# Patient Record
Sex: FEMALE | Race: AMERICAN INDIAN OR ALASKA NATIVE
[De-identification: names, ages, dates, MRNs, and addresses within clinical notes are randomized per-mention and may not be internally consistent; named-entity substitution may affect disease eponyms.]

---

## 2018-05-04 ENCOUNTER — HOSPITAL ENCOUNTER (EMERGENCY)
Dept: HOSPITAL 5 - ED | Age: 38
LOS: 6 days | Discharge: PSYCHIATRIC OR SUBSTANCE USE FACILITY | End: 2018-05-10
Payer: COMMERCIAL
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
## 2018-05-04 DIAGNOSIS — T40.4X2A: Primary | ICD-10-CM

## 2018-05-04 DIAGNOSIS — Z88.0: ICD-10-CM

## 2018-05-04 DIAGNOSIS — T42.8X2A: ICD-10-CM

## 2018-05-04 LAB
BACTERIA #/AREA URNS HPF: (no result) /HPF
BASOPHILS # (AUTO): 0 K/MM3 (ref 0–0.1)
BASOPHILS NFR BLD AUTO: 0.3 % (ref 0–1.8)
BENZODIAZEPINES SCREEN,URINE: (no result)
BILIRUB UR QL STRIP: (no result)
BLOOD UR QL VISUAL: (no result)
BUN SERPL-MCNC: 6 MG/DL (ref 7–17)
BUN/CREAT SERPL: 8 %
CALCIUM SERPL-MCNC: 9.1 MG/DL (ref 8.4–10.2)
EOSINOPHIL # BLD AUTO: 0.1 K/MM3 (ref 0–0.4)
EOSINOPHIL NFR BLD AUTO: 1 % (ref 0–4.3)
HCT VFR BLD CALC: 34 % (ref 30.3–42.9)
HEMOLYSIS INDEX: 9
HGB BLD-MCNC: 10.5 GM/DL (ref 10.1–14.3)
LYMPHOCYTES # BLD AUTO: 2.8 K/MM3 (ref 1.2–5.4)
LYMPHOCYTES NFR BLD AUTO: 31.7 % (ref 13.4–35)
MCH RBC QN AUTO: 24 PG (ref 28–32)
MCHC RBC AUTO-ENTMCNC: 31 % (ref 30–34)
MCV RBC AUTO: 77 FL (ref 79–97)
METHADONE SCREEN,URINE: (no result)
MONOCYTES # (AUTO): 0.4 K/MM3 (ref 0–0.8)
MONOCYTES % (AUTO): 4.9 % (ref 0–7.3)
MUCOUS THREADS #/AREA URNS HPF: (no result) /HPF
OPIATE SCREEN,URINE: (no result)
PH UR STRIP: 5 [PH] (ref 5–7)
PLATELET # BLD: 370 K/MM3 (ref 140–440)
PROT UR STRIP-MCNC: (no result) MG/DL
RBC # BLD AUTO: 4.44 M/MM3 (ref 3.65–5.03)
RBC #/AREA URNS HPF: 10 /HPF (ref 0–6)
UROBILINOGEN UR-MCNC: < 2 MG/DL (ref ?–2)
WBC #/AREA URNS HPF: 13 /HPF (ref 0–6)

## 2018-05-04 PROCEDURE — 93010 ELECTROCARDIOGRAM REPORT: CPT

## 2018-05-04 PROCEDURE — 99285 EMERGENCY DEPT VISIT HI MDM: CPT

## 2018-05-04 PROCEDURE — 82550 ASSAY OF CK (CPK): CPT

## 2018-05-04 PROCEDURE — 80048 BASIC METABOLIC PNL TOTAL CA: CPT

## 2018-05-04 PROCEDURE — 36415 COLL VENOUS BLD VENIPUNCTURE: CPT

## 2018-05-04 PROCEDURE — 80320 DRUG SCREEN QUANTALCOHOLS: CPT

## 2018-05-04 PROCEDURE — 84703 CHORIONIC GONADOTROPIN ASSAY: CPT

## 2018-05-04 PROCEDURE — G0480 DRUG TEST DEF 1-7 CLASSES: HCPCS

## 2018-05-04 PROCEDURE — 93005 ELECTROCARDIOGRAM TRACING: CPT

## 2018-05-04 PROCEDURE — 81001 URINALYSIS AUTO W/SCOPE: CPT

## 2018-05-04 PROCEDURE — 85025 COMPLETE CBC W/AUTO DIFF WBC: CPT

## 2018-05-04 PROCEDURE — 80307 DRUG TEST PRSMV CHEM ANLYZR: CPT

## 2018-05-04 NOTE — EMERGENCY DEPARTMENT REPORT
ED General Adult HPI





- General


Chief complaint: Overdose


Stated complaint: POSSIBLE OD


Time Seen by Provider: 05/04/18 19:06


Source: patient, EMS (verbal report received from EMS.ems notes not available 

at time of  chart dictation), RN notes reviewed


Mode of arrival: Stretcher


Limitations: No Limitations





- History of Present Illness


Initial comments: 





This is a 37-year-old female who was previously unknown to this provider.  She 

is brought to the hospital by EMS for a suicide attempt and overdose.  She 

apparently attempted to overdose on tramadol and Robaxin at 3:30 PM.  She 

denies other coingestions.  Patient has no complaints at this time.  She denies 

headache, neck pain, chest pain, abdominal pain and shortness of breath.  She 

indicates she does not have access to guns or firearms.  She does not have 

hallucinations.  She is now remorseful about her attempt, and indicated she 

would like to live to care for her family and children.


-: Sudden


Consistency: now resolved


Improves with: none


Worsens with: none


Associated Symptoms: denies other symptoms.  denies: confusion, chest pain, 

cough, diaphoresis, fever/chills, headaches, loss of appetite, malaise, nausea/

vomiting, rash, seizure, shortness of breath, syncope, weakness





- Related Data


 Home Medications











 Medication  Instructions  Recorded  Confirmed  Last Taken


 


No Known Home Medications [No  05/04/18 05/04/18 Unknown





Reported Home Medications]    











 Allergies











Allergy/AdvReac Type Severity Reaction Status Date / Time


 


Penicillins AdvReac  Anaphylaxis Verified 05/04/18 19:05














ED Review of Systems


ROS: 


Stated complaint: POSSIBLE OD


Other details as noted in HPI





Comment: All other systems reviewed and negative


Gastrointestinal: denies: abdominal pain


Genitourinary: denies: dysuria


Neurological: denies: headache


Psychiatric: anxiety, depression, suicidal thoughts.  denies: auditory 

hallucinations, visual hallucinations, homicidal thoughts





ED Past Medical Hx





- Past Medical History


Previous Medical History?: No





- Surgical History


Past Surgical History?: Yes


Additional Surgical History: tubal ligation 2002





- Social History


Smoking Status: Never Smoker


Substance Use Type: None





- Medications


Home Medications: 


 Home Medications











 Medication  Instructions  Recorded  Confirmed  Last Taken  Type


 


No Known Home Medications [No  05/04/18 05/04/18 Unknown History





Reported Home Medications]     














ED Physical Exam





- General


Limitations: No Limitations


General appearance: alert, in distress





- Head


Head exam: Present: atraumatic, normocephalic





- Eye


Eye exam: Present: normal appearance, PERRL, EOMI





- ENT


ENT exam: Present: normal exam, normal orophraynx, mucous membranes moist, 

normal external ear exam





- Neck


Neck exam: Present: normal inspection, full ROM





- Respiratory


Respiratory exam: Present: normal lung sounds bilaterally.  Absent: respiratory 

distress





- Cardiovascular


Cardiovascular Exam: Present: normal rhythm, bradycardia, normal heart sounds.  

Absent: systolic murmur, diastolic murmur, rubs, gallop





- GI/Abdominal


GI/Abdominal exam: Present: soft, normal bowel sounds.  Absent: distended, 

tenderness, guarding, rebound, rigid, pulsatile mass





- Extremities Exam


Extremities exam: Present: normal inspection, full ROM, normal capillary 

refill.  Absent: pedal edema, joint swelling, calf tenderness





- Back Exam


Back exam: Present: normal inspection, full ROM.  Absent: tenderness, CVA 

tenderness (R), paraspinal tenderness, vertebral tenderness





- Neurological Exam


Neurological exam: Present: alert, oriented X3, CN II-XII intact, normal gait, 

other (Extraocular movements intact.  Tongue midline.  No facial droop.  Facial 

sensation intact to light touch in the V1, V2, V3 distribution bilaterally.  5 

and 5 strength in 4 extremities..  Sensation is intact to light touch in 4 

extremities.).  Absent: motor sensory deficit





- Psychiatric


Psychiatric exam: Present: depressed, anxious.  Absent: homicidal ideation





- Skin


Skin exam: Present: warm, dry, intact, normal color.  Absent: rash





ED Course


 Vital Signs











  05/04/18 05/04/18 05/04/18





  16:48 18:12 20:56


 


Temperature 98.7 F  99.1 F


 


Pulse Rate 57 L  57 L


 


Respiratory 18 16 18





Rate   


 


Blood Pressure 162/91  


 


Blood Pressure   141/80





[Left]   


 


O2 Sat by Pulse 99  99





Oximetry   














ED Medical Decision Making





- Lab Data


Result diagrams: 


 05/04/18 18:27





 05/04/18 18:27








 Vital Signs











  05/04/18 05/04/18 05/04/18





  16:48 18:12 20:56


 


Temperature 98.7 F  99.1 F


 


Pulse Rate 57 L  57 L


 


Respiratory 18 16 18





Rate   


 


Blood Pressure 162/91  


 


Blood Pressure   141/80





[Left]   


 


O2 Sat by Pulse 99  99





Oximetry   











Labs











  05/04/18 05/04/18 05/04/18





  18:27 18:27 18:27


 


WBC   


 


RBC   


 


Hgb   


 


Hct   


 


MCV   


 


MCH   


 


MCHC   


 


RDW   


 


Plt Count   


 


Lymph % (Auto)   


 


Mono % (Auto)   


 


Eos % (Auto)   


 


Baso % (Auto)   


 


Lymph #   


 


Mono #   


 


Eos #   


 


Baso #   


 


Seg Neutrophils %   


 


Seg Neutrophils #   


 


Sodium    140


 


Potassium    3.5 L


 


Chloride    103.3


 


Carbon Dioxide    22


 


Anion Gap    18


 


BUN    6 L


 


Creatinine    0.8


 


Estimated GFR    > 60


 


BUN/Creatinine Ratio    8


 


Glucose    94


 


Calcium    9.1


 


Total Creatine Kinase   


 


HCG, Qual   


 


Urine Color   


 


Urine Turbidity   


 


Urine pH   


 


Ur Specific Gravity   


 


Urine Protein   


 


Urine Glucose (UA)   


 


Urine Ketones   


 


Urine Blood   


 


Urine Nitrite   


 


Urine Bilirubin   


 


Urine Urobilinogen   


 


Ur Leukocyte Esterase   


 


Urine WBC (Auto)   


 


Urine RBC (Auto)   


 


U Epithel Cells (Auto)   


 


Urine Bacteria (Auto)   


 


Urine Mucus   


 


Salicylates  < 0.3 L  


 


Urine Opiates Screen   


 


Urine Methadone Screen   


 


Acetaminophen   24.8 


 


Ur Barbiturates Screen   


 


Ur Phencyclidine Scrn   


 


Ur Amphetamines Screen   


 


U Benzodiazepines Scrn   


 


Urine Cocaine Screen   


 


U Marijuana (THC) Screen   


 


Drugs of Abuse Note   


 


Plasma/Serum Alcohol   














  05/04/18 05/04/18 05/04/18





  18:27 18:27 18:27


 


WBC    8.8


 


RBC    4.44


 


Hgb    10.5


 


Hct    34.0


 


MCV    77 L


 


MCH    24 L


 


MCHC    31


 


RDW    19.6 H


 


Plt Count    370


 


Lymph % (Auto)    31.7


 


Mono % (Auto)    4.9


 


Eos % (Auto)    1.0


 


Baso % (Auto)    0.3


 


Lymph #    2.8


 


Mono #    0.4


 


Eos #    0.1


 


Baso #    0.0


 


Seg Neutrophils %    62.1


 


Seg Neutrophils #    5.5


 


Sodium   


 


Potassium   


 


Chloride   


 


Carbon Dioxide   


 


Anion Gap   


 


BUN   


 


Creatinine   


 


Estimated GFR   


 


BUN/Creatinine Ratio   


 


Glucose   


 


Calcium   


 


Total Creatine Kinase   


 


HCG, Qual   Negative 


 


Urine Color   


 


Urine Turbidity   


 


Urine pH   


 


Ur Specific Gravity   


 


Urine Protein   


 


Urine Glucose (UA)   


 


Urine Ketones   


 


Urine Blood   


 


Urine Nitrite   


 


Urine Bilirubin   


 


Urine Urobilinogen   


 


Ur Leukocyte Esterase   


 


Urine WBC (Auto)   


 


Urine RBC (Auto)   


 


U Epithel Cells (Auto)   


 


Urine Bacteria (Auto)   


 


Urine Mucus   


 


Salicylates   


 


Urine Opiates Screen   


 


Urine Methadone Screen   


 


Acetaminophen   


 


Ur Barbiturates Screen   


 


Ur Phencyclidine Scrn   


 


Ur Amphetamines Screen   


 


U Benzodiazepines Scrn   


 


Urine Cocaine Screen   


 


U Marijuana (THC) Screen   


 


Drugs of Abuse Note   


 


Plasma/Serum Alcohol  < 0.01  














  05/04/18 05/04/18 05/04/18





  18:27 19:29 20:50


 


WBC   


 


RBC   


 


Hgb   


 


Hct   


 


MCV   


 


MCH   


 


MCHC   


 


RDW   


 


Plt Count   


 


Lymph % (Auto)   


 


Mono % (Auto)   


 


Eos % (Auto)   


 


Baso % (Auto)   


 


Lymph #   


 


Mono #   


 


Eos #   


 


Baso #   


 


Seg Neutrophils %   


 


Seg Neutrophils #   


 


Sodium   


 


Potassium   


 


Chloride   


 


Carbon Dioxide   


 


Anion Gap   


 


BUN   


 


Creatinine   


 


Estimated GFR   


 


BUN/Creatinine Ratio   


 


Glucose   


 


Calcium   


 


Total Creatine Kinase  112  


 


HCG, Qual   


 


Urine Color    Yellow


 


Urine Turbidity    Clear


 


Urine pH    5.0


 


Ur Specific Gravity    1.018


 


Urine Protein    <15 mg/dl


 


Urine Glucose (UA)    Neg


 


Urine Ketones    Neg


 


Urine Blood    Mod


 


Urine Nitrite    Neg


 


Urine Bilirubin    Neg


 


Urine Urobilinogen    < 2.0


 


Ur Leukocyte Esterase    Tr


 


Urine WBC (Auto)    13.0 H


 


Urine RBC (Auto)    10.0


 


U Epithel Cells (Auto)    1.0


 


Urine Bacteria (Auto)    2+


 


Urine Mucus    Few


 


Salicylates   


 


Urine Opiates Screen   


 


Urine Methadone Screen   


 


Acetaminophen   21.3 


 


Ur Barbiturates Screen   


 


Ur Phencyclidine Scrn   


 


Ur Amphetamines Screen   


 


U Benzodiazepines Scrn   


 


Urine Cocaine Screen   


 


U Marijuana (THC) Screen   


 


Drugs of Abuse Note   


 


Plasma/Serum Alcohol   














  05/04/18





  20:50


 


WBC 


 


RBC 


 


Hgb 


 


Hct 


 


MCV 


 


MCH 


 


MCHC 


 


RDW 


 


Plt Count 


 


Lymph % (Auto) 


 


Mono % (Auto) 


 


Eos % (Auto) 


 


Baso % (Auto) 


 


Lymph # 


 


Mono # 


 


Eos # 


 


Baso # 


 


Seg Neutrophils % 


 


Seg Neutrophils # 


 


Sodium 


 


Potassium 


 


Chloride 


 


Carbon Dioxide 


 


Anion Gap 


 


BUN 


 


Creatinine 


 


Estimated GFR 


 


BUN/Creatinine Ratio 


 


Glucose 


 


Calcium 


 


Total Creatine Kinase 


 


HCG, Qual 


 


Urine Color 


 


Urine Turbidity 


 


Urine pH 


 


Ur Specific Gravity 


 


Urine Protein 


 


Urine Glucose (UA) 


 


Urine Ketones 


 


Urine Blood 


 


Urine Nitrite 


 


Urine Bilirubin 


 


Urine Urobilinogen 


 


Ur Leukocyte Esterase 


 


Urine WBC (Auto) 


 


Urine RBC (Auto) 


 


U Epithel Cells (Auto) 


 


Urine Bacteria (Auto) 


 


Urine Mucus 


 


Salicylates 


 


Urine Opiates Screen  Presumptive negative


 


Urine Methadone Screen  Presumptive positive


 


Acetaminophen 


 


Ur Barbiturates Screen  Presumptive negative


 


Ur Phencyclidine Scrn  Presumptive negative


 


Ur Amphetamines Screen  Presumptive negative


 


U Benzodiazepines Scrn  Presumptive positive


 


Urine Cocaine Screen  Presumptive negative


 


U Marijuana (THC) Screen  Presumptive positive


 


Drugs of Abuse Note  Disclamer


 


Plasma/Serum Alcohol 














- EKG Data


-: EKG Interpreted by Me


EKG shows normal: sinus rhythm, axis, intervals, QRS complexes, ST-T waves


Rate: bradycardia





- EKG Data


When compared to previous EKG there are: previous EKG unavailable





- Medical Decision Making





Differential diagnosis, including but not limited to: Polysubstance overdose, 

polysubstance ingestion, depression, suicide attempt, medical clearance for 

psychiatric placement











Assessment and plan: 37-year-old female status post overdose attempt.  Patient 

presented more than 1 hour after ingestion, and is therefore not a charcoal 

candidate.  She is clinically sober at this time with a GCS of 15, and an NIH 

score of 0.  Her physical exam is unremarkable and the patient has been calm, 

pleasant and cooperative.  She was placed on a 1013.  Serum toxicology studies 

were sent and were negative with the exception of acetaminophen level, which is 

down trending.


The patient was observed on a cardiac monitor for hours as per the 

recommendations of the [place] [facility].  The patient has not had 

any untoward events, and is now medically stable for psychiatric consultation, 

placement and evaluation.  At this point in time, there does not appear to be 

an immediate medical contraindication to psychiatric placement/consultation and 

evaluation.








Critical care attestation.: 


If time is entered above; I have spent that time in minutes in the direct care 

of this critically ill patient, excluding procedure time.








ED Disposition


Clinical Impression: 


 Medical clearance for psychiatric admission





Disposition: [id number] PSY HOSP/PSY UNIT


Is pt being admited?: No


Does the pt Need Aspirin: No


Condition: Good


Referrals: 


PRIMARY CARE,MD [Primary Care Provider] - 3-5 Days
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
## 2018-05-08 NOTE — PROGRESS NOTE
Subjective





- Reason for Consult


Consult date: 05/08/18


Reason for consult: Psychiatry Follow-up





- Chief Complaint


Chief complaint: 


"I must make better choices"





This is a 37-year-old female who presented to the ER following an intentional 

overdose of 30 Tramadol and Robaxin. Today the patient is calm and cooperative 

during the assessment. She stated reflecting about her life since her 

admission. She stated making better decisions for her future is so important. 

She denies SI/HI's and AVH's. 








Mental Status Exam





- Vital signs


 Last Vital Signs











Temp  98 F   05/08/18 07:24


 


Pulse  85   05/08/18 07:24


 


Resp  16   05/08/18 07:24


 


BP  126/81   05/08/18 07:24


 


Pulse Ox  100   05/08/18 07:24














- Exam


Narrative exam: 


MSE:





 Appearance: calm, cooperative 


 Behavior: regular eye contact 


 Speech: regular rate and tone


 Mood: "okay"


 Affect: congruent to mood


 Thought Process: circumstantial    


 Thought Content: denies SI/HI's and AVH's


 Motor Activity: sitting up in bed


 Cognition: A/O x3 


 Insight: fair 


 Judgment: fair  

















Assessment and Plan


Impression: MDD, Severe Type. R/O PTSD. Overdose on Tramadol and Robaxin. 

Cannabis Use DO. Today the patient is calm and cooperative during the 

assessment.





DDx: R/O Bipolar DO, R/O Substance Induced Mood DO





Recommendation: Continue 1013 with placement to inpatient psy services. 

Discussed risk/benefits of antidepressants with patient, she prefer therapy at 

this time. Discussed generalized coping skills with patient.

## 2018-05-09 NOTE — PROGRESS NOTE
Subjective





- Reason for Consult


Consult date: 05/09/18


Reason for consult: Psychiatric Follow-up Evaluation





- Chief Complaint


Chief complaint: 


""





This is a 37-year-old female who presented to the ER following an intentional 

overdose of 30 Tramadol and Robaxin. Today the patient is calm and cooperative 

during the assessment. She stated reflecting about her life since her 

admission. She stated making better decisions for her future is so important. 

She denies SI/HI's and AVH's. 








Mental Status Exam





- Vital signs


 Last Vital Signs











Temp  97.9 F   05/09/18 07:54


 


Pulse  80   05/09/18 07:54


 


Resp  18   05/09/18 07:54


 


BP  129/85   05/09/18 07:54


 


Pulse Ox  100   05/09/18 07:54














- Exam


Narrative exam: 


Mental Status Exam:





 Appearance: calm, cooperative 


 Behavior: regular eye contact 


 Speech: regular rate and tone


 Mood: "okay"


 Affect: congruent to mood


 Thought Process: circumstantial    


 Thought Content: denies SI/HI's and AVH's


 Motor Activity: sitting up in bed


 Cognition: A/O x3 


 Insight: fair 


 Judgment: fair








Assessment and Plan


Impression: MDD, Severe Type. R/O PTSD. Overdose on Tramadol and Robaxin. 

Cannabis Use DO. Today the patient is calm and cooperative during the 

assessment.





DDx: R/O Bipolar DO, R/O Substance Induced Mood DO





Recommendation: Continue 1013 with placement to inpatient psy services. 

Discussed risk/benefits of antidepressants with patient, she prefer therapy at 

this time. Discussed generalized coping skills with patient.

## 2018-05-10 VITALS — SYSTOLIC BLOOD PRESSURE: 124 MMHG | DIASTOLIC BLOOD PRESSURE: 74 MMHG

## 2018-05-10 NOTE — PROGRESS NOTE
Subjective





- Reason for Consult


Consult date: 05/10/18


Reason for consult: Psychiatry Follow-up





- Chief Complaint


Chief complaint: 


"I will do this again"





This is a 37-year-old female who presented to the ER following an intentional 

overdose of 30 Tramadol and Robaxin. Today the patient is calm and cooperative 

during the assessment. She stated that she plan to see a therapist when 

discharged. Per collateral information from her "best friend" Enzo Krueger 

at [phone], she stated that the patient made a mistake by taking multiple 

pills. She denies that the patient has ever tried to kill herself in the past. 

She stated being the patient's support system. She stated that she will pick 

the patient up once discharged. The patient stated that her mother has paid her 

financial obligations for 3 months. One of the patient's stressor was her 

financial obligations. She denies SI/HI's and AVH's.  





Mental Status Exam





- Vital signs


 Last Vital Signs











Temp  97.6 F   05/10/18 14:24


 


Pulse  80   05/10/18 14:24


 


Resp  18   05/10/18 14:24


 


BP  124/74   05/10/18 14:24


 


Pulse Ox  97   05/10/18 14:24














- Exam


Narrative exam: 


MSE:





 Appearance: calm, cooperative 


 Behavior: regular eye contact 


 Speech: regular rate and tone


 Mood: "okay"


 Affect: congruent to mood


 Thought Process: linear   


 Thought Content: denies SI/HI's and AVH's


 Motor Activity: sitting up in bed


 Cognition: A/O x3 


 Insight: appropriate 


 Judgment: appropriate








Assessment and Plan


Impression: MDD, Severe Type. R/O PTSD. Overdose on Tramadol and Robaxin. 

Cannabis Use DO. Today the patient is calm and cooperative during the 

assessment. The patient is no threat to self.





DDx: R/O Bipolar DO, R/O Substance Induced Mood DO





I. This screening and assessment is based on information collected from the 

following sources:


   


II.  SUICIDE RISK SCREENING (within last 30 days):


A.) Suicidal thoughts/behaviors: Yes





SUICIDE RISK ASSESSMENT


III.  FACTORS THAT INCREASE RISK:





A.) Demographic and Substance Use Factors: Yes (Marijuana)





B.) Current/Recent Factors (within past 3 months):


Psychosocial/Environmental Factors: Financial Stressors and Relationship 

problems 


Physical Illness: Past CVA


Cognitive/Psychological Factors: None


 


C.) Historical Factors: None





D.) Diagnostic/Symptom/Treatment Factors: None





E.) Acute Risk Factor Severity


(DESC; MILD/MOD/SEVERE): Mild





Other factors for this individual that increase risk: None





IV.  FACTORS THAT DECREASE RISK: 


Resilience/Protective Factors: Patient want to decrease her stress 


Other factors for this individual that decrease risk: Patient denies a desire 

to harm self





V. Clinician's Formulation of Risk and Determination of level of Care: 





This is a 37-year-old AA female who took multiple Tramadol and Robaxin pills. 

She stated having multiple life stressors prior to taking the pills. She stated 

that she should have called a crisis hotline during her crisis. She stated that 

she will follow-up with outpatient psy services once discharged. Since being 

hospitalized the patient has consistently denied the desire to harm herself. 

Additionally, she has become insightful about how to better address her current 

issues. The patient is not impaired by substance. She is able to take care of 

her ADLs and is not at imminent risk of harm to self or others. Consequently, 

it is the opinion of the treatment team that the patient is at low risk of 

suicide and does not meet criteria to continue an involuntary psychiatric hold.

  





Estimation of Imminent Risk: Low due to the above explanation.





Determination of Level of Care based on Suicide Risk: Outpatient follow-up. 





Narrative description of clinical reasoning.  (This must be completed on all 

patients): 





VI.  Plan and Interventions based on Suicide Risk: This patient will likely be 

stepped down to an outpatient mental health center in the community upon 

discharge and follow-up within 7 days of her discharge from the hospital.





VII.  Discharge/After Hours Support Plan: Patient can return back to the ER, 

call 911 or crisis line if symptoms of depression, anxiety, suicidality return. 





Recommendation/Plan: Rescind 1013. Discussed risk/benefits of antidepressants 

with patient, she prefer therapy at this time. Discussed generalized coping 

skills with patient. Discussed the importance to abstain from recreational drug 

use. Safety Contract completed with patient. The patient can follow-up with 

outpatient psy with [facility].